# Patient Record
Sex: FEMALE | Race: WHITE | NOT HISPANIC OR LATINO
[De-identification: names, ages, dates, MRNs, and addresses within clinical notes are randomized per-mention and may not be internally consistent; named-entity substitution may affect disease eponyms.]

---

## 2017-10-03 ENCOUNTER — APPOINTMENT (OUTPATIENT)
Dept: DERMATOLOGY | Facility: CLINIC | Age: 72
End: 2017-10-03
Payer: MEDICARE

## 2017-10-03 DIAGNOSIS — L60.3 NAIL DYSTROPHY: ICD-10-CM

## 2017-10-03 DIAGNOSIS — L65.9 NONSCARRING HAIR LOSS, UNSPECIFIED: ICD-10-CM

## 2017-10-03 PROCEDURE — 99203 OFFICE O/P NEW LOW 30 MIN: CPT

## 2017-10-03 RX ORDER — DILTIAZEM HYDROCHLORIDE 60 MG/1
TABLET, COATED ORAL
Refills: 0 | Status: ACTIVE | COMMUNITY

## 2018-01-23 ENCOUNTER — APPOINTMENT (OUTPATIENT)
Dept: DERMATOLOGY | Facility: CLINIC | Age: 73
End: 2018-01-23
Payer: MEDICARE

## 2018-01-23 VITALS — DIASTOLIC BLOOD PRESSURE: 72 MMHG | SYSTOLIC BLOOD PRESSURE: 128 MMHG

## 2018-01-23 PROCEDURE — 99213 OFFICE O/P EST LOW 20 MIN: CPT

## 2018-01-23 RX ORDER — METRONIDAZOLE 7.5 MG/G
0.75 GEL TOPICAL
Qty: 1 | Refills: 1 | Status: ACTIVE | COMMUNITY
Start: 2018-01-23 | End: 1900-01-01

## 2018-01-23 RX ORDER — BACITRACIN 500 [USP'U]/G
500 OINTMENT OPHTHALMIC
Qty: 3 | Refills: 0 | Status: ACTIVE | COMMUNITY
Start: 2017-09-24

## 2018-01-23 RX ORDER — DILTIAZEM HYDROCHLORIDE 30 MG/1
30 TABLET ORAL
Qty: 45 | Refills: 0 | Status: ACTIVE | COMMUNITY
Start: 2017-08-28

## 2018-01-23 RX ORDER — ALPRAZOLAM 0.25 MG/1
0.25 TABLET ORAL
Qty: 30 | Refills: 0 | Status: ACTIVE | COMMUNITY
Start: 2017-08-07

## 2018-03-20 ENCOUNTER — LABORATORY RESULT (OUTPATIENT)
Age: 73
End: 2018-03-20

## 2018-03-20 ENCOUNTER — APPOINTMENT (OUTPATIENT)
Dept: DERMATOLOGY | Facility: CLINIC | Age: 73
End: 2018-03-20
Payer: MEDICARE

## 2018-03-20 VITALS — DIASTOLIC BLOOD PRESSURE: 80 MMHG | SYSTOLIC BLOOD PRESSURE: 170 MMHG

## 2018-03-20 DIAGNOSIS — L82.0 INFLAMED SEBORRHEIC KERATOSIS: ICD-10-CM

## 2018-03-20 DIAGNOSIS — L71.0 PERIORAL DERMATITIS: ICD-10-CM

## 2018-03-20 DIAGNOSIS — D48.5 NEOPLASM OF UNCERTAIN BEHAVIOR OF SKIN: ICD-10-CM

## 2018-03-20 PROCEDURE — 11100 BX SKIN SUBCUTANEOUS&/MUCOUS MEMBRANE 1 LESION: CPT

## 2018-03-20 PROCEDURE — 99213 OFFICE O/P EST LOW 20 MIN: CPT | Mod: 25

## 2018-10-16 ENCOUNTER — APPOINTMENT (OUTPATIENT)
Dept: DERMATOLOGY | Facility: CLINIC | Age: 73
End: 2018-10-16
Payer: MEDICARE

## 2018-10-16 ENCOUNTER — LABORATORY RESULT (OUTPATIENT)
Age: 73
End: 2018-10-16

## 2018-10-16 DIAGNOSIS — D48.5 NEOPLASM OF UNCERTAIN BEHAVIOR OF SKIN: ICD-10-CM

## 2018-10-16 PROCEDURE — 11100 BX SKIN SUBCUTANEOUS&/MUCOUS MEMBRANE 1 LESION: CPT

## 2018-10-16 PROCEDURE — 99213 OFFICE O/P EST LOW 20 MIN: CPT | Mod: 25

## 2019-01-24 ENCOUNTER — APPOINTMENT (OUTPATIENT)
Dept: DERMATOLOGY | Facility: CLINIC | Age: 74
End: 2019-01-24

## 2019-03-28 ENCOUNTER — OUTPATIENT (OUTPATIENT)
Dept: OUTPATIENT SERVICES | Facility: HOSPITAL | Age: 74
LOS: 1 days | End: 2019-03-28
Payer: COMMERCIAL

## 2019-03-28 VITALS
TEMPERATURE: 98 F | SYSTOLIC BLOOD PRESSURE: 180 MMHG | WEIGHT: 110.01 LBS | HEART RATE: 84 BPM | DIASTOLIC BLOOD PRESSURE: 82 MMHG | OXYGEN SATURATION: 97 % | RESPIRATION RATE: 15 BRPM | HEIGHT: 62 IN

## 2019-03-28 DIAGNOSIS — K80.10 CALCULUS OF GALLBLADDER WITH CHRONIC CHOLECYSTITIS WITHOUT OBSTRUCTION: ICD-10-CM

## 2019-03-28 DIAGNOSIS — K80.20 CALCULUS OF GALLBLADDER WITHOUT CHOLECYSTITIS WITHOUT OBSTRUCTION: ICD-10-CM

## 2019-03-28 DIAGNOSIS — F41.9 ANXIETY DISORDER, UNSPECIFIED: ICD-10-CM

## 2019-03-28 DIAGNOSIS — Z98.49 CATARACT EXTRACTION STATUS, UNSPECIFIED EYE: Chronic | ICD-10-CM

## 2019-03-28 DIAGNOSIS — Z01.818 ENCOUNTER FOR OTHER PREPROCEDURAL EXAMINATION: ICD-10-CM

## 2019-03-28 LAB
ALBUMIN SERPL ELPH-MCNC: 3.9 G/DL — SIGNIFICANT CHANGE UP (ref 3.3–5)
ALP SERPL-CCNC: 50 U/L — SIGNIFICANT CHANGE UP (ref 30–120)
ALT FLD-CCNC: 37 U/L DA — SIGNIFICANT CHANGE UP (ref 10–60)
ANION GAP SERPL CALC-SCNC: 10 MMOL/L — SIGNIFICANT CHANGE UP (ref 5–17)
AST SERPL-CCNC: 23 U/L — SIGNIFICANT CHANGE UP (ref 10–40)
BILIRUB SERPL-MCNC: 1.2 MG/DL — SIGNIFICANT CHANGE UP (ref 0.2–1.2)
BLD GP AB SCN SERPL QL: SIGNIFICANT CHANGE UP
BUN SERPL-MCNC: 23 MG/DL — SIGNIFICANT CHANGE UP (ref 7–23)
CALCIUM SERPL-MCNC: 9.7 MG/DL — SIGNIFICANT CHANGE UP (ref 8.4–10.5)
CHLORIDE SERPL-SCNC: 101 MMOL/L — SIGNIFICANT CHANGE UP (ref 96–108)
CO2 SERPL-SCNC: 28 MMOL/L — SIGNIFICANT CHANGE UP (ref 22–31)
CREAT SERPL-MCNC: 1.11 MG/DL — SIGNIFICANT CHANGE UP (ref 0.5–1.3)
GLUCOSE SERPL-MCNC: 116 MG/DL — HIGH (ref 70–99)
HCT VFR BLD CALC: 44.2 % — SIGNIFICANT CHANGE UP (ref 34.5–45)
HGB BLD-MCNC: 14.6 G/DL — SIGNIFICANT CHANGE UP (ref 11.5–15.5)
MCHC RBC-ENTMCNC: 30.9 PG — SIGNIFICANT CHANGE UP (ref 27–34)
MCHC RBC-ENTMCNC: 33 GM/DL — SIGNIFICANT CHANGE UP (ref 32–36)
MCV RBC AUTO: 93.6 FL — SIGNIFICANT CHANGE UP (ref 80–100)
NRBC # BLD: 0 /100 WBCS — SIGNIFICANT CHANGE UP (ref 0–0)
PLATELET # BLD AUTO: 236 K/UL — SIGNIFICANT CHANGE UP (ref 150–400)
POTASSIUM SERPL-MCNC: 4 MMOL/L — SIGNIFICANT CHANGE UP (ref 3.5–5.3)
POTASSIUM SERPL-SCNC: 4 MMOL/L — SIGNIFICANT CHANGE UP (ref 3.5–5.3)
PROT SERPL-MCNC: 7.9 G/DL — SIGNIFICANT CHANGE UP (ref 6–8.3)
RBC # BLD: 4.72 M/UL — SIGNIFICANT CHANGE UP (ref 3.8–5.2)
RBC # FLD: 13.4 % — SIGNIFICANT CHANGE UP (ref 10.3–14.5)
SODIUM SERPL-SCNC: 139 MMOL/L — SIGNIFICANT CHANGE UP (ref 135–145)
WBC # BLD: 5.98 K/UL — SIGNIFICANT CHANGE UP (ref 3.8–10.5)
WBC # FLD AUTO: 5.98 K/UL — SIGNIFICANT CHANGE UP (ref 3.8–10.5)

## 2019-03-28 PROCEDURE — 85027 COMPLETE CBC AUTOMATED: CPT

## 2019-03-28 PROCEDURE — 86900 BLOOD TYPING SEROLOGIC ABO: CPT

## 2019-03-28 PROCEDURE — 36415 COLL VENOUS BLD VENIPUNCTURE: CPT

## 2019-03-28 PROCEDURE — 86901 BLOOD TYPING SEROLOGIC RH(D): CPT

## 2019-03-28 PROCEDURE — 93005 ELECTROCARDIOGRAM TRACING: CPT

## 2019-03-28 PROCEDURE — 80053 COMPREHEN METABOLIC PANEL: CPT

## 2019-03-28 PROCEDURE — 93010 ELECTROCARDIOGRAM REPORT: CPT

## 2019-03-28 PROCEDURE — G0463: CPT

## 2019-03-28 PROCEDURE — 86850 RBC ANTIBODY SCREEN: CPT

## 2019-03-28 NOTE — H&P PST ADULT - NSICDXPROBLEM_GEN_ALL_CORE_FT
PROBLEM DIAGNOSES  Problem: Gallstones  Assessment and Plan: laparoscopic cholecystectomy on 4/11/19  Diagnostics ordered  Pending medical clearance  Preoperative instructions were reviewed and signed  Contact info given  Best wishes offered    Problem: Anxiousness  Assessment and Plan: extensive time was taken to reassure the patient

## 2019-03-28 NOTE — H&P PST ADULT - HISTORY OF PRESENT ILLNESS
74 yo female presents to [facility] for scheduled laparoscopic cholecystectomy on 4/11/19 with David Tate MD.   Reports 2 episodes of epigastric pain recently for which she was diagnosed with gallstones and advised removal.

## 2019-03-28 NOTE — H&P PST ADULT - NEGATIVE ENMT SYMPTOMS
no ear pain/no recurrent cold sores/no hearing difficulty/no gum bleeding/no throat pain/no dysphagia/no tinnitus/no nasal discharge/no nasal congestion/no nasal obstruction/no post-nasal discharge/no nose bleeds/no abnormal taste sensation/no dry mouth/no vertigo/no sinus symptoms

## 2019-03-28 NOTE — H&P PST ADULT - NSICDXPASTMEDICALHX_GEN_ALL_CORE_FT
PAST MEDICAL HISTORY:  Anxiety     Anxiousness related to surgery    Gallstones     Multiple allergies     Seasonal allergies PAST MEDICAL HISTORY:  Anxiety     Anxiousness related to surgery    Gallstones     Hypothyroidism     Multiple allergies     Seasonal allergies     Sensitive skin PAST MEDICAL HISTORY:  Anxiety     Anxiousness related to surgery    Gallstones     HTN (hypertension)     Hypothyroidism     Multiple allergies     Seasonal allergies     Sensitive skin

## 2019-03-28 NOTE — H&P PST ADULT - NSICDXFAMILYHX_GEN_ALL_CORE_FT
FAMILY HISTORY:  Mother  Still living? No  Family history of breast cancer in mother, Age at diagnosis: Age Unknown    Sibling  Still living? Yes, Estimated age: 71-80  Family history of heart disease, Age at diagnosis: Age Unknown

## 2019-03-29 PROBLEM — F41.9 ANXIETY DISORDER, UNSPECIFIED: Chronic | Status: ACTIVE | Noted: 2019-03-28

## 2019-04-08 RX ORDER — CHLORHEXIDINE GLUCONATE 213 G/1000ML
1 SOLUTION TOPICAL ONCE
Qty: 0 | Refills: 0 | Status: COMPLETED | OUTPATIENT
Start: 2019-04-11 | End: 2019-04-11

## 2019-04-08 RX ORDER — ALPRAZOLAM 0.25 MG
1 TABLET ORAL
Qty: 0 | Refills: 0 | COMMUNITY

## 2019-04-08 NOTE — ASU DISCHARGE PLAN (ADULT/PEDIATRIC) - CALL YOUR DOCTOR IF YOU HAVE ANY OF THE FOLLOWING:
Fever greater than (need to indicate Fahrenheit or Celsius)/Pain not relieved by Medications/Unable to urinate/Swelling that gets worse/Excessive diarrhea/Wound/Surgical Site with redness, or foul smelling discharge or pus/Bleeding that does not stop/Inability to tolerate liquids or foods/Numbness, tingling, color or temperature change to extremity/Nausea and vomiting that does not stop/Increased irritability or sluggishness

## 2019-04-08 NOTE — ASU DISCHARGE PLAN (ADULT/PEDIATRIC) - FOLLOW UP APPOINTMENTS
911 or go to the nearest Emergency Room/[facility] [place] 911 or go to the nearest Emergency Room

## 2019-04-08 NOTE — ASU DISCHARGE PLAN (ADULT/PEDIATRIC) - COMMENTS
Office visit in 7-10 days. Please call to arrange  Office visit in 7-10 days. Please call to arrange

## 2019-04-08 NOTE — ASU DISCHARGE PLAN (ADULT/PEDIATRIC) - ASU DC SPECIAL INSTRUCTIONSFT
apply ice to operative site 20 minutes on and 20 minutes off for next 48 hours  Pain meds as per MD  Take an over the counter stool softener like Colace to avoid constipation while taking a narcotic for pain Please call Dr. MARIANNE Tate's office ([phone]) to schedule a follow-up appointment to be seen in 7-10 days.    REST! Apply ice packs to incision sites 20 mins on, 20 mins off every 4 hours for first 24 hours.  If taking narcotic pain medications, may take COLACE (OTC stool softener) as directed to prevent constipation.

## 2019-04-08 NOTE — ASU DISCHARGE PLAN (ADULT/PEDIATRIC) - CARE PROVIDER_API CALL
Kei Tate (MD)  Surgery; Surgical Critical Care  [facility][note 3][facility], [address] [place] [address]  Phone: [phone]  Fax: [phone]  Follow Up Time:

## 2019-04-10 ENCOUNTER — TRANSCRIPTION ENCOUNTER (OUTPATIENT)
Age: 74
End: 2019-04-10

## 2019-04-11 ENCOUNTER — RESULT REVIEW (OUTPATIENT)
Age: 74
End: 2019-04-11

## 2019-04-11 ENCOUNTER — OUTPATIENT (OUTPATIENT)
Dept: OUTPATIENT SERVICES | Facility: HOSPITAL | Age: 74
LOS: 1 days | End: 2019-04-11
Payer: COMMERCIAL

## 2019-04-11 VITALS
TEMPERATURE: 98 F | HEIGHT: 61 IN | OXYGEN SATURATION: 99 % | WEIGHT: 110.23 LBS | DIASTOLIC BLOOD PRESSURE: 62 MMHG | SYSTOLIC BLOOD PRESSURE: 163 MMHG | HEART RATE: 77 BPM | RESPIRATION RATE: 17 BRPM

## 2019-04-11 VITALS
HEART RATE: 64 BPM | OXYGEN SATURATION: 100 % | SYSTOLIC BLOOD PRESSURE: 146 MMHG | RESPIRATION RATE: 18 BRPM | DIASTOLIC BLOOD PRESSURE: 68 MMHG

## 2019-04-11 DIAGNOSIS — K80.10 CALCULUS OF GALLBLADDER WITH CHRONIC CHOLECYSTITIS WITHOUT OBSTRUCTION: ICD-10-CM

## 2019-04-11 DIAGNOSIS — Z01.818 ENCOUNTER FOR OTHER PREPROCEDURAL EXAMINATION: ICD-10-CM

## 2019-04-11 DIAGNOSIS — Z98.49 CATARACT EXTRACTION STATUS, UNSPECIFIED EYE: Chronic | ICD-10-CM

## 2019-04-11 LAB — ABO RH CONFIRMATION: SIGNIFICANT CHANGE UP

## 2019-04-11 PROCEDURE — 47562 LAPAROSCOPIC CHOLECYSTECTOMY: CPT

## 2019-04-11 PROCEDURE — 88307 TISSUE EXAM BY PATHOLOGIST: CPT | Mod: 26

## 2019-04-11 PROCEDURE — 36415 COLL VENOUS BLD VENIPUNCTURE: CPT

## 2019-04-11 PROCEDURE — 47562 LAPAROSCOPIC CHOLECYSTECTOMY: CPT | Mod: AS

## 2019-04-11 PROCEDURE — 88307 TISSUE EXAM BY PATHOLOGIST: CPT

## 2019-04-11 PROCEDURE — 88304 TISSUE EXAM BY PATHOLOGIST: CPT | Mod: 26

## 2019-04-11 PROCEDURE — 47379 UNLISTED LAPS PX LIVER: CPT

## 2019-04-11 PROCEDURE — 88313 SPECIAL STAINS GROUP 2: CPT

## 2019-04-11 PROCEDURE — 88304 TISSUE EXAM BY PATHOLOGIST: CPT

## 2019-04-11 PROCEDURE — 88313 SPECIAL STAINS GROUP 2: CPT | Mod: 26

## 2019-04-11 RX ORDER — CHOLECALCIFEROL (VITAMIN D3) 125 MCG
1 CAPSULE ORAL
Qty: 0 | Refills: 0 | COMMUNITY

## 2019-04-11 RX ORDER — ONDANSETRON 8 MG/1
4 TABLET, FILM COATED ORAL ONCE
Qty: 0 | Refills: 0 | Status: DISCONTINUED | OUTPATIENT
Start: 2019-04-11 | End: 2019-04-12

## 2019-04-11 RX ORDER — HYDROMORPHONE HYDROCHLORIDE 2 MG/ML
0.5 INJECTION INTRAMUSCULAR; INTRAVENOUS; SUBCUTANEOUS
Qty: 0 | Refills: 0 | Status: DISCONTINUED | OUTPATIENT
Start: 2019-04-11 | End: 2019-04-12

## 2019-04-11 RX ORDER — SODIUM CHLORIDE 9 MG/ML
1000 INJECTION, SOLUTION INTRAVENOUS
Qty: 0 | Refills: 0 | Status: DISCONTINUED | OUTPATIENT
Start: 2019-04-11 | End: 2019-04-12

## 2019-04-11 RX ORDER — ALPRAZOLAM 0.25 MG
0 TABLET ORAL
Qty: 0 | Refills: 0 | COMMUNITY

## 2019-04-11 RX ORDER — ACETAMINOPHEN 500 MG
1000 TABLET ORAL ONCE
Qty: 0 | Refills: 0 | Status: COMPLETED | OUTPATIENT
Start: 2019-04-11 | End: 2019-04-11

## 2019-04-11 RX ORDER — DIPHENHYDRAMINE HCL 50 MG
1 CAPSULE ORAL
Qty: 0 | Refills: 0 | COMMUNITY

## 2019-04-11 RX ORDER — LEVOTHYROXINE SODIUM 125 MCG
1 TABLET ORAL
Qty: 0 | Refills: 0 | COMMUNITY

## 2019-04-11 RX ORDER — METOPROLOL TARTRATE 50 MG
1 TABLET ORAL
Qty: 0 | Refills: 0 | COMMUNITY

## 2019-04-11 RX ORDER — FLUTICASONE PROPIONATE 50 MCG
1 SPRAY, SUSPENSION NASAL
Qty: 0 | Refills: 0 | COMMUNITY

## 2019-04-11 RX ORDER — L.ACIDOPH/B.ANIMALIS/B.LONGUM 15B CELL
1 CAPSULE ORAL
Qty: 0 | Refills: 0 | COMMUNITY

## 2019-04-11 RX ORDER — ACETAMINOPHEN WITH CODEINE 300MG-30MG
1 TABLET ORAL
Qty: 12 | Refills: 0 | OUTPATIENT
Start: 2019-04-11 | End: 2019-04-13

## 2019-04-11 RX ORDER — ALPRAZOLAM 0.25 MG
1 TABLET ORAL
Qty: 0 | Refills: 0 | COMMUNITY

## 2019-04-11 RX ORDER — OXYCODONE HYDROCHLORIDE 5 MG/1
5 TABLET ORAL ONCE
Qty: 0 | Refills: 0 | Status: DISCONTINUED | OUTPATIENT
Start: 2019-04-11 | End: 2019-04-11

## 2019-04-11 RX ADMIN — OXYCODONE HYDROCHLORIDE 5 MILLIGRAM(S): 5 TABLET ORAL at 12:18

## 2019-04-11 RX ADMIN — OXYCODONE HYDROCHLORIDE 5 MILLIGRAM(S): 5 TABLET ORAL at 12:46

## 2019-04-11 RX ADMIN — SODIUM CHLORIDE 100 MILLILITER(S): 9 INJECTION, SOLUTION INTRAVENOUS at 11:31

## 2019-04-11 RX ADMIN — CHLORHEXIDINE GLUCONATE 1 APPLICATION(S): 213 SOLUTION TOPICAL at 09:08

## 2019-04-11 NOTE — BRIEF OPERATIVE NOTE - NSICDXBRIEFPREOP_GEN_ALL_CORE_FT
PRE-OP DIAGNOSIS:  Elevated liver enzymes 11-Apr-2019 11:26:28  Holly Grier  Chronic cholecystitis with calculus 11-Apr-2019 11:26:15  Holly Grier

## 2019-04-11 NOTE — BRIEF OPERATIVE NOTE - NSICDXBRIEFPROCEDURE_GEN_ALL_CORE_FT
PROCEDURES:  Needle biopsy, liver 11-Apr-2019 11:25:51  Holly Grier  Laparoscopic cholecystectomy 11-Apr-2019 11:25:38  Holly Grier

## 2019-04-11 NOTE — BRIEF OPERATIVE NOTE - NSICDXBRIEFPOSTOP_GEN_ALL_CORE_FT
POST-OP DIAGNOSIS:  Elevated liver enzymes 11-Apr-2019 11:27:01  Holly Grier  Chronic cholecystitis with calculus 11-Apr-2019 11:26:40  Holly Grier

## 2019-04-15 ENCOUNTER — RESULT REVIEW (OUTPATIENT)
Age: 74
End: 2019-04-15

## 2019-08-06 PROBLEM — R20.3 HYPERESTHESIA: Chronic | Status: ACTIVE | Noted: 2019-03-28

## 2019-08-06 PROBLEM — E03.9 HYPOTHYROIDISM, UNSPECIFIED: Chronic | Status: ACTIVE | Noted: 2019-03-28

## 2019-08-06 PROBLEM — K80.20 CALCULUS OF GALLBLADDER WITHOUT CHOLECYSTITIS WITHOUT OBSTRUCTION: Chronic | Status: ACTIVE | Noted: 2019-03-28

## 2019-08-06 PROBLEM — F41.9 ANXIETY DISORDER, UNSPECIFIED: Chronic | Status: ACTIVE | Noted: 2019-03-28

## 2019-08-06 PROBLEM — J30.2 OTHER SEASONAL ALLERGIC RHINITIS: Chronic | Status: ACTIVE | Noted: 2019-03-28

## 2019-08-06 PROBLEM — Z88.9 ALLERGY STATUS TO UNSPECIFIED DRUGS, MEDICAMENTS AND BIOLOGICAL SUBSTANCES: Chronic | Status: ACTIVE | Noted: 2019-03-28

## 2019-08-06 PROBLEM — I10 ESSENTIAL (PRIMARY) HYPERTENSION: Chronic | Status: ACTIVE | Noted: 2019-04-08

## 2019-10-31 ENCOUNTER — APPOINTMENT (OUTPATIENT)
Dept: DERMATOLOGY | Facility: CLINIC | Age: 74
End: 2019-10-31

## 2019-11-25 ENCOUNTER — APPOINTMENT (OUTPATIENT)
Dept: DERMATOLOGY | Facility: CLINIC | Age: 74
End: 2019-11-25
Payer: MEDICARE

## 2019-11-25 VITALS — DIASTOLIC BLOOD PRESSURE: 90 MMHG | SYSTOLIC BLOOD PRESSURE: 180 MMHG

## 2019-11-25 PROCEDURE — 99214 OFFICE O/P EST MOD 30 MIN: CPT

## 2020-11-17 ENCOUNTER — APPOINTMENT (OUTPATIENT)
Dept: DERMATOLOGY | Facility: CLINIC | Age: 75
End: 2020-11-17
Payer: MEDICARE

## 2020-11-17 VITALS — WEIGHT: 112 LBS | HEIGHT: 61.5 IN | BODY MASS INDEX: 20.87 KG/M2

## 2020-11-17 PROCEDURE — 99214 OFFICE O/P EST MOD 30 MIN: CPT

## 2021-12-29 ENCOUNTER — APPOINTMENT (OUTPATIENT)
Dept: DERMATOLOGY | Facility: CLINIC | Age: 76
End: 2021-12-29
Payer: MEDICARE

## 2021-12-29 PROCEDURE — 99213 OFFICE O/P EST LOW 20 MIN: CPT

## 2023-01-12 ENCOUNTER — LABORATORY RESULT (OUTPATIENT)
Age: 78
End: 2023-01-12

## 2023-01-13 ENCOUNTER — APPOINTMENT (OUTPATIENT)
Dept: DERMATOLOGY | Facility: CLINIC | Age: 78
End: 2023-01-13
Payer: MEDICARE

## 2023-01-13 DIAGNOSIS — D48.5 NEOPLASM OF UNCERTAIN BEHAVIOR OF SKIN: ICD-10-CM

## 2023-01-13 DIAGNOSIS — L60.3 NAIL DYSTROPHY: ICD-10-CM

## 2023-01-13 DIAGNOSIS — L84 CORNS AND CALLOSITIES: ICD-10-CM

## 2023-01-13 PROCEDURE — 99214 OFFICE O/P EST MOD 30 MIN: CPT | Mod: 25

## 2023-01-13 PROCEDURE — 11102 TANGNTL BX SKIN SINGLE LES: CPT

## 2023-01-14 PROBLEM — L60.3 ONYCHORRHEXIS: Status: ACTIVE | Noted: 2023-01-13

## 2023-01-24 ENCOUNTER — NON-APPOINTMENT (OUTPATIENT)
Age: 78
End: 2023-01-24

## 2023-04-18 ENCOUNTER — APPOINTMENT (OUTPATIENT)
Dept: DERMATOLOGY | Facility: CLINIC | Age: 78
End: 2023-04-18
Payer: MEDICARE

## 2023-04-18 DIAGNOSIS — L57.0 ACTINIC KERATOSIS: ICD-10-CM

## 2023-04-18 PROCEDURE — 99213 OFFICE O/P EST LOW 20 MIN: CPT

## 2023-04-18 NOTE — PHYSICAL EXAM
[Alert] : alert [Oriented x 3] : ~L oriented x 3 [Well Nourished] : well nourished [Conjunctiva Non-injected] : conjunctiva non-injected [No Visual Lymphadenopathy] : no visual  lymphadenopathy [No Clubbing] : no clubbing [No Edema] : no edema [No Bromhidrosis] : no bromhidrosis [No Chromhidrosis] : no chromhidrosis [FreeTextEntry3] : L nasal sidewall with pink patch with central scar

## 2023-04-18 NOTE — HISTORY OF PRESENT ILLNESS
[FreeTextEntry1] : f/u AK [de-identified] : 77F here for f/u AK on the nose. Notes the area is smooth. No problems following bx

## 2024-02-27 ENCOUNTER — APPOINTMENT (OUTPATIENT)
Dept: DERMATOLOGY | Facility: CLINIC | Age: 79
End: 2024-02-27
Payer: MEDICARE

## 2024-02-27 DIAGNOSIS — L81.4 OTHER MELANIN HYPERPIGMENTATION: ICD-10-CM

## 2024-02-27 DIAGNOSIS — D18.01 HEMANGIOMA OF SKIN AND SUBCUTANEOUS TISSUE: ICD-10-CM

## 2024-02-27 DIAGNOSIS — L82.1 OTHER SEBORRHEIC KERATOSIS: ICD-10-CM

## 2024-02-27 DIAGNOSIS — Z12.83 ENCOUNTER FOR SCREENING FOR MALIGNANT NEOPLASM OF SKIN: ICD-10-CM

## 2024-02-27 PROCEDURE — 99213 OFFICE O/P EST LOW 20 MIN: CPT

## 2024-10-21 NOTE — H&P PST ADULT - NEGATIVE MUSCULOSKELETAL SYMPTOMS
21-Oct-2024 04:46
no joint swelling/no myalgia/no stiffness/no arthritis/no neck pain/no muscle cramps/no muscle weakness

## 2025-04-11 ENCOUNTER — APPOINTMENT (OUTPATIENT)
Dept: DERMATOLOGY | Facility: CLINIC | Age: 80
End: 2025-04-11
Payer: MEDICARE

## 2025-04-11 DIAGNOSIS — D22.9 MELANOCYTIC NEVI, UNSPECIFIED: ICD-10-CM

## 2025-04-11 DIAGNOSIS — Z12.83 ENCOUNTER FOR SCREENING FOR MALIGNANT NEOPLASM OF SKIN: ICD-10-CM

## 2025-04-11 DIAGNOSIS — L57.0 ACTINIC KERATOSIS: ICD-10-CM

## 2025-04-11 PROCEDURE — 17000 DESTRUCT PREMALG LESION: CPT

## 2025-04-11 PROCEDURE — 99213 OFFICE O/P EST LOW 20 MIN: CPT | Mod: 25
